# Patient Record
Sex: MALE | Race: WHITE | NOT HISPANIC OR LATINO | Employment: OTHER | URBAN - NONMETROPOLITAN AREA
[De-identification: names, ages, dates, MRNs, and addresses within clinical notes are randomized per-mention and may not be internally consistent; named-entity substitution may affect disease eponyms.]

---

## 2023-09-05 ENCOUNTER — OFFICE VISIT (OUTPATIENT)
Dept: CARDIOLOGY | Facility: CLINIC | Age: 73
End: 2023-09-05
Payer: MEDICARE

## 2023-09-05 VITALS
DIASTOLIC BLOOD PRESSURE: 62 MMHG | HEART RATE: 88 BPM | OXYGEN SATURATION: 96 % | SYSTOLIC BLOOD PRESSURE: 100 MMHG | BODY MASS INDEX: 34.4 KG/M2 | HEIGHT: 73 IN | WEIGHT: 259.6 LBS

## 2023-09-05 DIAGNOSIS — I10 PRIMARY HYPERTENSION: ICD-10-CM

## 2023-09-05 DIAGNOSIS — R06.09 DYSPNEA ON EXERTION: ICD-10-CM

## 2023-09-05 DIAGNOSIS — R07.2 PRECORDIAL PAIN: Primary | ICD-10-CM

## 2023-09-05 PROCEDURE — 99204 OFFICE O/P NEW MOD 45 MIN: CPT | Performed by: PHYSICIAN ASSISTANT

## 2023-09-05 PROCEDURE — 1160F RVW MEDS BY RX/DR IN RCRD: CPT | Performed by: PHYSICIAN ASSISTANT

## 2023-09-05 PROCEDURE — 1159F MED LIST DOCD IN RCRD: CPT | Performed by: PHYSICIAN ASSISTANT

## 2023-09-05 RX ORDER — METFORMIN HYDROCHLORIDE 500 MG/1
1 TABLET, EXTENDED RELEASE ORAL 3 TIMES DAILY
COMMUNITY
Start: 2023-08-24

## 2023-09-05 RX ORDER — FUROSEMIDE 20 MG/1
20 TABLET ORAL DAILY PRN
COMMUNITY
Start: 2023-08-23

## 2023-09-05 RX ORDER — AMLODIPINE BESYLATE 10 MG/1
10 TABLET ORAL DAILY
COMMUNITY
Start: 2023-08-23

## 2023-09-05 RX ORDER — POTASSIUM CHLORIDE 750 MG/1
10 TABLET, FILM COATED, EXTENDED RELEASE ORAL DAILY
COMMUNITY
Start: 2023-08-23

## 2023-09-05 RX ORDER — NAPROXEN 500 MG/1
500 TABLET ORAL 2 TIMES DAILY PRN
COMMUNITY
Start: 2023-07-05

## 2023-09-05 RX ORDER — MELATONIN
1000 DAILY
COMMUNITY

## 2023-09-05 RX ORDER — SIMVASTATIN 20 MG
20 TABLET ORAL
COMMUNITY
Start: 2023-08-23

## 2023-09-05 RX ORDER — LISINOPRIL 20 MG/1
20 TABLET ORAL DAILY
COMMUNITY
Start: 2023-08-23

## 2023-09-05 RX ORDER — NITROGLYCERIN 0.4 MG/1
0.4 TABLET SUBLINGUAL SEE ADMIN INSTRUCTIONS
COMMUNITY
Start: 2023-07-05

## 2023-09-05 RX ORDER — ISOSORBIDE MONONITRATE 30 MG/1
30 TABLET, EXTENDED RELEASE ORAL DAILY
COMMUNITY

## 2023-09-05 RX ORDER — TRIAMTERENE AND HYDROCHLOROTHIAZIDE 37.5; 25 MG/1; MG/1
1 TABLET ORAL DAILY
COMMUNITY
Start: 2023-08-23

## 2023-09-05 NOTE — PROGRESS NOTES
"Subjective   Aniket Ayoub is a 73 y.o. male     Chief Complaint   Patient presents with    Establish Care     Cardiac eval    Chest Pain    Shortness of Breath       HPI    The patient presents in the clinic today to establish cardiovascular care.  This patient denies established cardiovascular history.  He is referred because of episodic chest pain, jaw pain, and upper extremity discomfort noted historically.  He tells me that he started experiencing the symptoms a number of months ago.  This appears to have intensified slightly.  His family member who is with him today feels that this is likely to a degree related to stress.  Still, he will have onset of what he feels and describes as \"episodes\".  He will have onset of weakness and dizziness.  He then notes a slight headache.  Shortly after, he will have chest tightness, which has referral to possibly bilateral jaw distribution, and eventually right upper extremity.  Symptoms last for several minutes and then resolved.  This can recur at times several minutes later.  He is found nothing else specifically which aggravates or alleviates his discomfort.  Sometimes, he will have associated palpitations but never really any sustained dysrhythmic activity with this.  He has no PND nor orthopnea associated.  He reported the symptoms to his primary care provider.  At that time, an EKG and laboratories were performed, both being largely benign.  It was recommended he have cardiac evaluation because of ongoing symptoms.  He presents today in that setting.    Current Outpatient Medications   Medication Sig Dispense Refill    amLODIPine (NORVASC) 10 MG tablet Take 1 tablet by mouth Daily.      Cholecalciferol 25 MCG (1000 UT) tablet Take 1 tablet by mouth Daily.      furosemide (LASIX) 20 MG tablet Take 1 tablet by mouth Daily As Needed. swelling      Glucosamine-Chondroit-Vit C-Mn (GLUCOSAMINE 1500 COMPLEX PO) Take  by mouth.      lisinopril (PRINIVIL,ZESTRIL) 20 MG tablet " Take 1 tablet by mouth Daily.      metFORMIN ER (GLUCOPHAGE-XR) 500 MG 24 hr tablet Take 1 tablet by mouth 3 (Three) Times a Day.      nitroglycerin (NITROSTAT) 0.4 MG SL tablet Place 1 tablet under the tongue See Admin Instructions. Dissolve 1 tablet under tongue every 5 minutes as needed for chest pain, after 3 tablets go to ER      potassium chloride 10 MEQ CR tablet Take 1 tablet by mouth Daily. When takes lasix      simvastatin (ZOCOR) 20 MG tablet Take 1 tablet by mouth every night at bedtime.      triamterene-hydrochlorothiazide (MAXZIDE-25) 37.5-25 MG per tablet Take 1 tablet by mouth Daily.      isosorbide mononitrate (IMDUR) 30 MG 24 hr tablet Take 1 tablet by mouth Daily. (Patient not taking: Reported on 2023)      naproxen (NAPROSYN) 500 MG tablet Take 1 tablet by mouth 2 (Two) Times a Day As Needed. (Patient not taking: Reported on 2023)       No current facility-administered medications for this visit.       Patient has no known allergies.    Past Medical History:   Diagnosis Date    Diabetes mellitus     Hyperlipidemia     Hypertension        Social History     Socioeconomic History    Marital status: Single   Tobacco Use    Smoking status: Former     Packs/day: 2.00     Years: 20.00     Pack years: 40.00     Types: Cigarettes     Quit date: 1980     Years since quittin.3    Smokeless tobacco: Never   Substance and Sexual Activity    Alcohol use: Never    Drug use: Never    Sexual activity: Defer       Family History   Problem Relation Age of Onset    Heart disease Mother     Heart disease Father        Review of Systems   Constitutional:  Positive for fatigue. Negative for chills, diaphoresis and fever.   HENT: Negative.     Eyes: Negative.  Negative for visual disturbance (wears glasses).   Respiratory:  Positive for cough and shortness of breath. Negative for apnea, chest tightness and wheezing.    Cardiovascular:  Positive for chest pain and leg swelling (ble). Negative for  "palpitations.   Gastrointestinal: Negative.  Negative for abdominal pain and blood in stool.   Endocrine: Negative.  Negative for cold intolerance and heat intolerance.   Genitourinary: Negative.  Negative for hematuria.   Musculoskeletal:  Positive for back pain. Negative for arthralgias, myalgias, neck pain and neck stiffness.   Skin: Negative.  Negative for rash and wound.   Allergic/Immunologic: Positive for environmental allergies (bee stings). Negative for food allergies.   Neurological:  Positive for dizziness. Negative for syncope, weakness, light-headedness, numbness and headaches.   Hematological:  Bruises/bleeds easily.   Psychiatric/Behavioral:  Negative for sleep disturbance.      Objective     Vitals:    09/05/23 1429   BP: 100/62   BP Location: Left arm   Patient Position: Sitting   Pulse: 88   SpO2: 96%   Weight: 118 kg (259 lb 9.6 oz)   Height: 185.4 cm (73\")        /62 (BP Location: Left arm, Patient Position: Sitting)   Pulse 88   Ht 185.4 cm (73\")   Wt 118 kg (259 lb 9.6 oz)   SpO2 96%   BMI 34.25 kg/m²      Lab Results (most recent)       None            Physical Exam  Vitals and nursing note reviewed.   Constitutional:       General: He is not in acute distress.     Appearance: He is well-developed.   HENT:      Head: Normocephalic and atraumatic.   Eyes:      Conjunctiva/sclera: Conjunctivae normal.      Pupils: Pupils are equal, round, and reactive to light.   Neck:      Vascular: No JVD.      Trachea: No tracheal deviation.   Cardiovascular:      Rate and Rhythm: Normal rate and regular rhythm.      Heart sounds: Normal heart sounds.   Pulmonary:      Effort: Pulmonary effort is normal.      Breath sounds: Normal breath sounds.   Abdominal:      General: Bowel sounds are normal. There is no distension.      Palpations: Abdomen is soft. There is no mass.      Tenderness: There is no abdominal tenderness. There is no guarding or rebound.   Musculoskeletal:         General: No " tenderness or deformity. Normal range of motion.      Cervical back: Normal range of motion and neck supple.   Skin:     General: Skin is warm and dry.      Coloration: Skin is not pale.      Findings: No erythema or rash.   Neurological:      Mental Status: He is alert and oriented to person, place, and time.   Psychiatric:         Behavior: Behavior normal.         Thought Content: Thought content normal.         Judgment: Judgment normal.       Procedure   Procedures         Assessment & Plan      Diagnosis Plan   1. Precordial pain  Adult Transthoracic Echo Complete W/ Cont if Necessary Per Protocol    Stress Test With Myocardial Perfusion One Day      2. Dyspnea on exertion  Adult Transthoracic Echo Complete W/ Cont if Necessary Per Protocol    Stress Test With Myocardial Perfusion One Day      3. Primary hypertension  Adult Transthoracic Echo Complete W/ Cont if Necessary Per Protocol    Stress Test With Myocardial Perfusion One Day        1.  The patient presents for evaluation because of chest pain, arm and jaw pain, and symptoms otherwise as above.  Noninvasive cardiac evaluation has been requested.    2.  We will schedule for stress test for ischemia assessment.  The patient cannot tolerate treadmill protocol and will be scheduled for Lexiscan protocol.    3.  I would also schedule for an echo.  We can evaluate LV size and function, as well as cardiac structure otherwise.    4.  For now, the patient appears to be on appropriate medications.  He tells me that blood pressure and lipid parameters are well maintained on current regimen.  We will make no adjustments at the same.    5.  We will see him back to discuss study results and recommended further.  He will call for any issues.         Advance Care Planning   ACP discussion was held with the patient during this visit. Patient has an advance directive (not in EMR), copy requested.       Electronically signed by:

## 2023-09-06 ENCOUNTER — PATIENT ROUNDING (BHMG ONLY) (OUTPATIENT)
Dept: CARDIOLOGY | Facility: CLINIC | Age: 73
End: 2023-09-06
Payer: MEDICARE

## 2023-09-06 NOTE — PROGRESS NOTES
September 6, 2023    Hello, may I speak with Aniket Ayoub?    My name is TONY AVILA      I am  with [facility]  [address] [place] [id number] [phone].    Before we get started may I verify your date of birth? 1950    I am calling to officially welcome you to our practice and ask about your recent visit. Is this a good time to talk? yes    Tell me about your visit with us. What things went well?  EVERYTHING WENT WELL.  HE CHECKED ME OUT AND EVERYTHING.         We're always looking for ways to make our patients' experiences even better. Do you have recommendations on ways we may improve?  no  GO[employer] SERVICES.   Overall were you satisfied with your first visit to our practice? yes       I appreciate you taking the time to speak with me today. Is there anything else I can do for you? No.        Thank you, and have a great day.

## 2023-10-05 ENCOUNTER — HOSPITAL ENCOUNTER (OUTPATIENT)
Dept: CARDIOLOGY | Facility: HOSPITAL | Age: 73
Discharge: HOME | End: 2023-10-05
Payer: MEDICARE

## 2023-10-05 DIAGNOSIS — R06.09 DYSPNEA ON EXERTION: ICD-10-CM

## 2023-10-05 DIAGNOSIS — I10 PRIMARY HYPERTENSION: ICD-10-CM

## 2023-10-05 DIAGNOSIS — R07.2 PRECORDIAL PAIN: ICD-10-CM

## 2023-10-05 LAB
BH CV ECHO MEAS - ACS: 2.44 CM
BH CV ECHO MEAS - AO MAX PG: 7.5 MMHG
BH CV ECHO MEAS - AO MEAN PG: 4 MMHG
BH CV ECHO MEAS - AO ROOT DIAM: 3.8 CM
BH CV ECHO MEAS - AO V2 MAX: 137 CM/SEC
BH CV ECHO MEAS - AO V2 VTI: 27.8 CM
BH CV ECHO MEAS - EDV(CUBED): 107.2 ML
BH CV ECHO MEAS - EDV(MOD-SP4): 121 ML
BH CV ECHO MEAS - EF(MOD-SP4): 72.4 %
BH CV ECHO MEAS - EF_3D-VOL: 50 %
BH CV ECHO MEAS - ESV(CUBED): 36.6 ML
BH CV ECHO MEAS - ESV(MOD-SP4): 33.4 ML
BH CV ECHO MEAS - FS: 30.1 %
BH CV ECHO MEAS - IVS/LVPW: 1.03 CM
BH CV ECHO MEAS - IVSD: 1.6 CM
BH CV ECHO MEAS - LA DIMENSION: 3.8 CM
BH CV ECHO MEAS - LAT PEAK E' VEL: 5.8 CM/SEC
BH CV ECHO MEAS - LV DIASTOLIC VOL/BSA (35-75): 50.4 CM2
BH CV ECHO MEAS - LV MASS(C)D: 323.2 GRAMS
BH CV ECHO MEAS - LV SYSTOLIC VOL/BSA (12-30): 13.9 CM2
BH CV ECHO MEAS - LVIDD: 4.8 CM
BH CV ECHO MEAS - LVIDS: 3.3 CM
BH CV ECHO MEAS - LVPWD: 1.56 CM
BH CV ECHO MEAS - MED PEAK E' VEL: 6.3 CM/SEC
BH CV ECHO MEAS - MV A MAX VEL: 115 CM/SEC
BH CV ECHO MEAS - MV DEC TIME: 0.26 SEC
BH CV ECHO MEAS - MV E MAX VEL: 79.3 CM/SEC
BH CV ECHO MEAS - MV E/A: 0.69
BH CV ECHO MEAS - RAP SYSTOLE: 10 MMHG
BH CV ECHO MEAS - RVSP: 26.1 MMHG
BH CV ECHO MEAS - SI(MOD-SP4): 36.5 ML/M2
BH CV ECHO MEAS - SV(MOD-SP4): 87.6 ML
BH CV ECHO MEAS - TR MAX PG: 16.1 MMHG
BH CV ECHO MEAS - TR MAX VEL: 200.9 CM/SEC
BH CV ECHO MEASUREMENTS AVERAGE E/E' RATIO: 13.11
BH CV XLRA - RV BASE: 3.3 CM
BH CV XLRA - RV LENGTH: 8.2 CM
BH CV XLRA - RV MID: 2.3 CM
LEFT ATRIUM VOLUME INDEX: 14.8 ML/M2

## 2023-10-05 PROCEDURE — A9500 TC99M SESTAMIBI: HCPCS | Performed by: INTERNAL MEDICINE

## 2023-10-05 PROCEDURE — 0 TECHNETIUM SESTAMIBI: Performed by: INTERNAL MEDICINE

## 2023-10-05 PROCEDURE — 93306 TTE W/DOPPLER COMPLETE: CPT

## 2023-10-05 PROCEDURE — 78452 HT MUSCLE IMAGE SPECT MULT: CPT

## 2023-10-05 PROCEDURE — 25010000002 REGADENOSON 0.4 MG/5ML SOLUTION: Performed by: INTERNAL MEDICINE

## 2023-10-05 PROCEDURE — 93017 CV STRESS TEST TRACING ONLY: CPT

## 2023-10-05 RX ORDER — REGADENOSON 0.08 MG/ML
0.4 INJECTION, SOLUTION INTRAVENOUS
Status: COMPLETED | OUTPATIENT
Start: 2023-10-05 | End: 2023-10-05

## 2023-10-05 RX ADMIN — TECHNETIUM TC 99M SESTAMIBI 1 DOSE: 1 INJECTION INTRAVENOUS at 10:13

## 2023-10-05 RX ADMIN — TECHNETIUM TC 99M SESTAMIBI 1 DOSE: 1 INJECTION INTRAVENOUS at 11:41

## 2023-10-05 RX ADMIN — REGADENOSON 0.4 MG: 0.08 INJECTION, SOLUTION INTRAVENOUS at 11:41

## 2023-10-07 LAB
BH CV REST NUCLEAR ISOTOPE DOSE: 10 MCI
BH CV STRESS COMMENTS STAGE 1: NORMAL
BH CV STRESS DOSE REGADENOSON STAGE 1: 0.4
BH CV STRESS DURATION MIN STAGE 1: 0
BH CV STRESS DURATION SEC STAGE 1: 10
BH CV STRESS NUCLEAR ISOTOPE DOSE: 30 MCI
BH CV STRESS PROTOCOL 1: NORMAL
BH CV STRESS RECOVERY BP: NORMAL MMHG
BH CV STRESS RECOVERY HR: 76 BPM
BH CV STRESS STAGE 1: 1
MAXIMAL PREDICTED HEART RATE: 148 BPM
PERCENT MAX PREDICTED HR: 48.65 %
STRESS BASELINE BP: NORMAL MMHG
STRESS BASELINE HR: 74 BPM
STRESS PERCENT HR: 57 %
STRESS POST PEAK BP: NORMAL MMHG
STRESS POST PEAK HR: 72 BPM
STRESS TARGET HR: 126 BPM

## 2023-10-11 ENCOUNTER — TELEPHONE (OUTPATIENT)
Dept: CARDIOLOGY | Facility: CLINIC | Age: 73
End: 2023-10-11
Payer: MEDICARE

## 2023-10-11 NOTE — TELEPHONE ENCOUNTER
----- Message from YULIYA Cullen sent at 10/10/2023  3:39 PM EDT -----  Routine follow-up.  ----- Message -----  From: Aniket Blanc MD  Sent: 10/10/2023  10:18 AM EDT  To: YULIYA Cullen    Adult Transthoracic Echo Complete W/ Cont if Necessary Per Protocol  Order: [id number]  Status: Final result       Visible to patient: No (not released)       Dx: Precordial pain; Primary hypertension...    0 Result Notes  Details    Reading Physician Reading Date Result Priority   Aniket Blanc MD  [phone] 10/10/2023 Routine     Result Text       Left ventricular ejection fraction appears to be 56 - 60%.    Left ventricular wall thickness is consistent with mild concentric hypertrophy.    Left ventricular diastolic function is consistent with (grade I) impaired relaxation.    The left atrial cavity is mildly dilated.    Estimated right ventricular systolic pressure from tricuspid regurgitation is normal (<35 mmHg).     Technically adequate study.     1.  LV size, function, and wall motion are normal.  Mild concentric left ventricular hypertrophy.  Visually estimated ejection fraction is 50 to 55%.  3D ejection fraction is 50%.  Grade 1 diastolic dysfunction.  Mild left atrial enlargement.  Right heart chambers are normal.  No septal defect or intracavitary mass or thrombus.     2.  The mitral valve is morphologically normal with no mitral stenosis and with trivial MR.  The aortic valve is grossly normally configured with no aortic stenosis and with trivial AI.  Trivial TR is demonstrated from a morphologically normal valve.     3.  There is no pericardial effusion present.  The aortic root is minimally dilated at 3.9 cm with no dissection or aneurysm.     4.  Pulmonary artery systolic pressures are estimated in the mid 20s.         Alert and oriented, no focal deficits, no motor or sensory deficits. There are no Wet Read(s) to document.

## 2023-10-11 NOTE — TELEPHONE ENCOUNTER
Patient notified of result's and recommendation's. Patient request that results be called to his brother apolinar longoria who is listed on his HIPAA form due to he states that he is getting very forgetful.     Results called to brother apolinar as well.

## 2023-10-11 NOTE — TELEPHONE ENCOUNTER
----- Message from YULIYA Cullen sent at 10/10/2023  3:39 PM EDT -----  Routine follow-up.  ----- Message -----  From: Aniket Blanc MD  Sent: 10/10/2023  10:18 AM EDT  To: YULIYA Cullen    Adult Transthoracic Echo Complete W/ Cont if Necessary Per Protocol  Order: [id number]  Status: Final result       Visible to patient: No (not released)       Dx: Precordial pain; Primary hypertension...    0 Result Notes       1 Follow-up Encounter  Details    Reading Physician Reading Date Result Priority   Aniket Blanc MD  [phone] 10/10/2023 Routine     Result Text       Left ventricular ejection fraction appears to be 56 - 60%.    Left ventricular wall thickness is consistent with mild concentric hypertrophy.    Left ventricular diastolic function is consistent with (grade I) impaired relaxation.    The left atrial cavity is mildly dilated.    Estimated right ventricular systolic pressure from tricuspid regurgitation is normal (<35 mmHg).     Technically adequate study.     1.  LV size, function, and wall motion are normal.  Mild concentric left ventricular hypertrophy.  Visually estimated ejection fraction is 50 to 55%.  3D ejection fraction is 50%.  Grade 1 diastolic dysfunction.  Mild left atrial enlargement.  Right heart chambers are normal.  No septal defect or intracavitary mass or thrombus.     2.  The mitral valve is morphologically normal with no mitral stenosis and with trivial MR.  The aortic valve is grossly normally configured with no aortic stenosis and with trivial AI.  Trivial TR is demonstrated from a morphologically normal valve.     3.  There is no pericardial effusion present.  The aortic root is minimally dilated at 3.9 cm with no dissection or aneurysm.     4.  Pulmonary artery systolic pressures are estimated in the mid 20s.

## 2024-05-08 ENCOUNTER — TELEPHONE (OUTPATIENT)
Dept: CARDIOLOGY | Facility: CLINIC | Age: 74
End: 2024-05-08

## 2024-05-08 NOTE — TELEPHONE ENCOUNTER
S/w Lara she is on HIPAA. She stated pt was not having any problems at the time. She was ok with Derek daily. Told to call back if anything changes.

## 2024-05-08 NOTE — TELEPHONE ENCOUNTER
Caller: SUSAN OLSON    Relationship to patient: Caregiver (non-relative)    Best call back number: [phone]    Chief complaint: PT IS NEEDING TO RESCHEDULE HIS APPT WITH [facility] DUE TO BAD WEATHER. NOTHING AVAILABLE TILL JAN 2025    Type of visit: 8 MONTH TESTING FU    Requested date: ASAP     If rescheduling, when is the original appointment: 05.08.24

## 2025-08-12 ENCOUNTER — OFFICE VISIT (OUTPATIENT)
Dept: CARDIOLOGY | Facility: CLINIC | Age: 75
End: 2025-08-12
Payer: MEDICARE

## 2025-08-12 VITALS
DIASTOLIC BLOOD PRESSURE: 64 MMHG | OXYGEN SATURATION: 97 % | WEIGHT: 264 LBS | HEIGHT: 73 IN | BODY MASS INDEX: 34.99 KG/M2 | SYSTOLIC BLOOD PRESSURE: 106 MMHG | HEART RATE: 70 BPM

## 2025-08-12 DIAGNOSIS — R06.09 DYSPNEA ON EXERTION: ICD-10-CM

## 2025-08-12 DIAGNOSIS — R07.2 PRECORDIAL PAIN: Primary | ICD-10-CM

## 2025-08-12 DIAGNOSIS — I10 PRIMARY HYPERTENSION: ICD-10-CM

## 2025-08-12 PROCEDURE — 99213 OFFICE O/P EST LOW 20 MIN: CPT | Performed by: PHYSICIAN ASSISTANT

## 2025-08-12 PROCEDURE — 1159F MED LIST DOCD IN RCRD: CPT | Performed by: PHYSICIAN ASSISTANT

## 2025-08-12 PROCEDURE — 93000 ELECTROCARDIOGRAM COMPLETE: CPT | Performed by: PHYSICIAN ASSISTANT

## 2025-08-12 PROCEDURE — 1160F RVW MEDS BY RX/DR IN RCRD: CPT | Performed by: PHYSICIAN ASSISTANT
